# Patient Record
Sex: FEMALE | Race: WHITE | URBAN - METROPOLITAN AREA
[De-identification: names, ages, dates, MRNs, and addresses within clinical notes are randomized per-mention and may not be internally consistent; named-entity substitution may affect disease eponyms.]

---

## 2023-01-19 ENCOUNTER — OFFICE VISIT (OUTPATIENT)
Dept: URBAN - METROPOLITAN AREA CLINIC 113 | Facility: CLINIC | Age: 48
End: 2023-01-19

## 2023-02-16 ENCOUNTER — TELEPHONE ENCOUNTER (OUTPATIENT)
Dept: URBAN - METROPOLITAN AREA CLINIC 113 | Facility: CLINIC | Age: 48
End: 2023-02-16

## 2023-02-16 ENCOUNTER — OFFICE VISIT (OUTPATIENT)
Dept: URBAN - METROPOLITAN AREA CLINIC 107 | Facility: CLINIC | Age: 48
End: 2023-02-16
Payer: COMMERCIAL

## 2023-02-16 VITALS
WEIGHT: 133 LBS | HEART RATE: 73 BPM | TEMPERATURE: 98.1 F | SYSTOLIC BLOOD PRESSURE: 116 MMHG | HEIGHT: 61 IN | BODY MASS INDEX: 25.11 KG/M2 | DIASTOLIC BLOOD PRESSURE: 89 MMHG

## 2023-02-16 DIAGNOSIS — D50.0 IRON DEFICIENCY ANEMIA DUE TO CHRONIC BLOOD LOSS: ICD-10-CM

## 2023-02-16 PROBLEM — 724556004: Status: ACTIVE | Noted: 2023-02-16

## 2023-02-16 PROCEDURE — 99204 OFFICE O/P NEW MOD 45 MIN: CPT | Performed by: INTERNAL MEDICINE

## 2023-02-16 PROCEDURE — 99244 OFF/OP CNSLTJ NEW/EST MOD 40: CPT | Performed by: INTERNAL MEDICINE

## 2023-02-16 RX ORDER — SODIUM, POTASSIUM,MAG SULFATES 17.5-3.13G
354 ML SOLUTION, RECONSTITUTED, ORAL ORAL ONCE
Qty: 354 MILLILITER | Refills: 0 | OUTPATIENT
Start: 2023-02-16 | End: 2023-02-17

## 2023-02-16 NOTE — HPI-TODAY'S VISIT:
47-year-old female is referred by Dr. Julien Galarza for evaluation of microcytic anemia.  A copy of today's visit will be forwarded to the referring provider. Per the referral note, she was admitted to [facility] in May with a hemoglobin of 3.9 on admission.  She was given 3 units of packed red blood cells.  Hemoglobin on discharge was 7.7.  She does also have a right peritoneal DVT.  This was thought to be due to birth control which is discontinued at discharge.  She completed 3 months of Eliquis and is now on aspirin 160 mg p.o. daily. Labs 5/6/2022:Negative protein electrophoresis panel, negative hemoglobinopathy profile,  negative protein C deficiency profile, mildly decreased protein S-a few 51, normal factor VII activity, normal homocystine, elevated iron binding capacity 501, elevated TIBC 474, normal serum iron, low iron saturation 5, low serum ferritin 14, low hemoglobin 8.5, low hematocrit 26.8, normal MCV. Labs 6/8/2022:Cholesterol 240, normal CRP cardiac.  She had a tummy tuck in September 2021. She began feeling poorly in January 2022. She thought she had long Covid even though she was never diagnosed with Covid. She states she was at a horse show in May and felt "awful." She did have SOB and fatigue at that time as well. SHe presented to the ER the next day at which time she was found to be anemic. Denies blood per rectum or melena. She no longer has SOB or fatigue, chest pain or presyncope. Denies heavy menstrual cycles. Bowel movements are regular. Denies nausea or vomiting. SHe did have a hiatal hernia seen on chest x-ray. She did have heartburn after her surgery however this resolved after two weeks of Nexium. Denies dysphagia. She will cough on occasion after eating or drinking something. Denies abdominal pain. Denies a family history of colon cancer. She believes her cousin was diagnosed with pancreatic cancer in her 50s. She has never had a colonoscopy or EGD.   She is followed by hematology. SHe has undergone two iron infusions. Her last infusion was yesterday.  There are five total sessions. She is followed by Dr. Gamboa.

## 2023-02-20 ENCOUNTER — OFFICE VISIT (OUTPATIENT)
Dept: URBAN - METROPOLITAN AREA CLINIC 113 | Facility: CLINIC | Age: 48
End: 2023-02-20

## 2023-04-11 ENCOUNTER — WEB ENCOUNTER (OUTPATIENT)
Dept: URBAN - METROPOLITAN AREA SURGERY CENTER 25 | Facility: SURGERY CENTER | Age: 48
End: 2023-04-11

## 2023-04-13 ENCOUNTER — OFFICE VISIT (OUTPATIENT)
Dept: URBAN - METROPOLITAN AREA SURGERY CENTER 25 | Facility: SURGERY CENTER | Age: 48
End: 2023-04-13
Payer: COMMERCIAL

## 2023-04-13 DIAGNOSIS — D50.0 IRON DEFICIENCY ANEMIA DUE TO CHRONIC BLOOD LOSS: ICD-10-CM

## 2023-04-13 DIAGNOSIS — K29.00 ACUTE EROSIVE GASTRITIS: ICD-10-CM

## 2023-04-13 PROCEDURE — G8907 PT DOC NO EVENTS ON DISCHARG: HCPCS | Performed by: INTERNAL MEDICINE

## 2023-04-13 PROCEDURE — 43239 EGD BIOPSY SINGLE/MULTIPLE: CPT | Performed by: INTERNAL MEDICINE

## 2023-04-13 RX ORDER — SODIUM, POTASSIUM,MAG SULFATES 17.5-3.13G
177ML SOLUTION, RECONSTITUTED, ORAL ORAL
Qty: 1 | OUTPATIENT
Start: 2023-04-13 | End: 2023-04-15

## 2023-04-13 RX ORDER — SODIUM, POTASSIUM,MAG SULFATES 17.5-3.13G
AS DIRECTED SOLUTION, RECONSTITUTED, ORAL ORAL
OUTPATIENT
Start: 2023-04-13

## 2023-04-19 ENCOUNTER — OFFICE VISIT (OUTPATIENT)
Dept: URBAN - METROPOLITAN AREA SURGERY CENTER 25 | Facility: SURGERY CENTER | Age: 48
End: 2023-04-19
Payer: COMMERCIAL

## 2023-04-19 DIAGNOSIS — D12.2 ADENOMA OF ASCENDING COLON: ICD-10-CM

## 2023-04-19 DIAGNOSIS — D50.9 ANEMIA, IRON DEFICIENCY: ICD-10-CM

## 2023-04-19 DIAGNOSIS — D12.3 ADENOMA OF TRANSVERSE COLON: ICD-10-CM

## 2023-04-19 PROCEDURE — 45380 COLONOSCOPY AND BIOPSY: CPT | Performed by: INTERNAL MEDICINE

## 2023-04-19 PROCEDURE — G8907 PT DOC NO EVENTS ON DISCHARG: HCPCS | Performed by: INTERNAL MEDICINE

## 2023-04-19 RX ORDER — SODIUM, POTASSIUM,MAG SULFATES 17.5-3.13G
AS DIRECTED SOLUTION, RECONSTITUTED, ORAL ORAL
Status: ACTIVE | COMMUNITY
Start: 2023-04-13

## 2023-04-25 ENCOUNTER — TELEPHONE ENCOUNTER (OUTPATIENT)
Dept: URBAN - METROPOLITAN AREA CLINIC 113 | Facility: CLINIC | Age: 48
End: 2023-04-25

## 2023-05-02 ENCOUNTER — OFFICE VISIT (OUTPATIENT)
Dept: URBAN - METROPOLITAN AREA CLINIC 112 | Facility: CLINIC | Age: 48
End: 2023-05-02

## 2023-05-19 ENCOUNTER — OFFICE VISIT (OUTPATIENT)
Dept: URBAN - METROPOLITAN AREA CLINIC 107 | Facility: CLINIC | Age: 48
End: 2023-05-19
Payer: COMMERCIAL

## 2023-05-19 ENCOUNTER — DASHBOARD ENCOUNTERS (OUTPATIENT)
Age: 48
End: 2023-05-19

## 2023-05-19 ENCOUNTER — TELEPHONE ENCOUNTER (OUTPATIENT)
Dept: URBAN - METROPOLITAN AREA CLINIC 113 | Facility: CLINIC | Age: 48
End: 2023-05-19

## 2023-05-19 VITALS
WEIGHT: 138 LBS | HEIGHT: 61 IN | SYSTOLIC BLOOD PRESSURE: 139 MMHG | BODY MASS INDEX: 26.06 KG/M2 | RESPIRATION RATE: 16 BRPM | HEART RATE: 73 BPM | TEMPERATURE: 98 F | DIASTOLIC BLOOD PRESSURE: 86 MMHG

## 2023-05-19 DIAGNOSIS — D50.0 IRON DEFICIENCY ANEMIA DUE TO CHRONIC BLOOD LOSS: ICD-10-CM

## 2023-05-19 DIAGNOSIS — K44.9 HIATAL HERNIA: ICD-10-CM

## 2023-05-19 DIAGNOSIS — D12.6 TUBULAR ADENOMA OF COLON: ICD-10-CM

## 2023-05-19 PROCEDURE — 99214 OFFICE O/P EST MOD 30 MIN: CPT

## 2023-05-19 RX ORDER — SODIUM, POTASSIUM,MAG SULFATES 17.5-3.13G
AS DIRECTED SOLUTION, RECONSTITUTED, ORAL ORAL
Status: ON HOLD | COMMUNITY
Start: 2023-04-13

## 2023-05-19 NOTE — HPI-TODAY'S VISIT:
48-year-old female presents for follow-up after EGD and colonoscopy performed for evaluation of marked iron deficiency anemia.  She was last seen on 2/16/2023 and plan for EGD, colonoscopy and PillCam.  She was to continue iron infusions with hematology.  EGD 4/13/2023: Normal esophagus.  Large hiatal hernia.  Gastritis noted and biopsied.  Pathology revealed acute erosive gastritis, negative for H. pylori, intestinal metaplasia or malignancy.  Normal duodenum.  Colonoscopy 4/19/2023: Performed without difficulty.  BBPS score of 9.  Grade 2 nonbleeding internal hemorrhoids.  TI was normal.  Diverticulosis in the sigmoid colon.  Removal of two 5 mm tubular adenomas in the transverse colon and ascending colon.  Distal rectum and anal verge are normal.  Repeat colonoscopy in 5 years for surveillance.  Her insurance has denied to the PillCam due to being deemed medically unnecessary.  Per Dr. Burns we would hold off for now and address at follow-up.  It is unclear as to why several PillCam procedures have been denied by insurance lately.   She had an iron infusion in February. She had labs done two weeks ago that showed normal iron levels. Her next blood work is planned for August. Again she denies donating blood. She denies dizziness, fatigue, shortness of breath, blood per rectum or melena. She is otherwise doing well from a GI standpoint. Her hematologist is frustrated that her insurance has denied the PillCam.  2/16/2023: 47-year-old female is referred by Dr. Julien Galarza for evaluation of microcytic anemia.  A copy of today's visit will be forwarded to the referring provider. Per the referral note, she was admitted to [facility] in May with a hemoglobin of 3.9 on admission.  She was given 3 units of packed red blood cells.  Hemoglobin on discharge was 7.7.  She does also have a right peritoneal DVT.  This was thought to be due to birth control which is discontinued at discharge.  She completed 3 months of Eliquis and is now on aspirin 160 mg p.o. daily. Labs 5/6/2022:Negative protein electrophoresis panel, negative hemoglobinopathy profile,  negative protein C deficiency profile, mildly decreased protein S-a few 51, normal factor VII activity, normal homocystine, elevated iron binding capacity 501, elevated TIBC 474, normal serum iron, low iron saturation 5, low serum ferritin 14, low hemoglobin 8.5, low hematocrit 26.8, normal MCV. Labs 6/8/2022:Cholesterol 240, normal CRP cardiac.  She had a tummy tuck in September 2021. She began feeling poorly in January 2022. She thought she had long Covid even though she was never diagnosed with Covid. She states she was at a horse show in May and felt "awful." She did have SOB and fatigue at that time as well. SHe presented to the ER the next day at which time she was found to be anemic. Denies blood per rectum or melena. She no longer has SOB or fatigue, chest pain or presyncope. Denies heavy menstrual cycles. Bowel movements are regular. Denies nausea or vomiting. SHe did have a hiatal hernia seen on chest x-ray. She did have heartburn after her surgery however this resolved after two weeks of Nexium. Denies dysphagia. She will cough on occasion after eating or drinking something. Denies abdominal pain. Denies a family history of colon cancer. She believes her cousin was diagnosed with pancreatic cancer in her 50s. She has never had a colonoscopy or EGD.   She is followed by hematology. SHe has undergone two iron infusions. Her last infusion was yesterday.  There are five total sessions. She is followed by Dr. Gamboa.